# Patient Record
Sex: MALE | Race: WHITE | NOT HISPANIC OR LATINO | Employment: FULL TIME | URBAN - NONMETROPOLITAN AREA
[De-identification: names, ages, dates, MRNs, and addresses within clinical notes are randomized per-mention and may not be internally consistent; named-entity substitution may affect disease eponyms.]

---
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
## 2018-01-05 NOTE — PROGRESS NOTES
Patient Information     Patient Name MRN Sex Reji Monterroso [id number] Male 2000      Progress Notes by Richard Greene MD at 2017 11:15 AM     Author:  Richard Greene MD Service:  (none) Author Type:  Physician     Filed:  2017 11:47 AM Encounter Date:  2017 Status:  Signed     :  Richard Greene MD (Physician)            Has a wart on left knee he would llike removed- it bleeds when rubs on jeans and is uncomfortable/ this is an 8 mm wart/ treated with cryo

## 2018-01-05 NOTE — NURSING NOTE
Patient Information     Patient Name MRN Sex Reji Monterroso [id number] Male 2000      Nursing Note by Lynnette Sauer at 2017 11:15 AM     Author:  Lynnette Sauer Service:  (none) Author Type:  (none)     Filed:  2017 11:35 AM Encounter Date:  2017 Status:  Signed     :  Lynnette Sauer            Patient presents to the clinic to have a lesion checked on his left knee.  He noticed it while wrestling about 4 months ago.    Lynnette Sauer LPN....................2017 11:20 AM

## 2018-01-27 VITALS
SYSTOLIC BLOOD PRESSURE: 118 MMHG | DIASTOLIC BLOOD PRESSURE: 82 MMHG | BODY MASS INDEX: 24.14 KG/M2 | HEIGHT: 67 IN | WEIGHT: 153.8 LBS

## 2018-02-13 ENCOUNTER — DOCUMENTATION ONLY (OUTPATIENT)
Dept: FAMILY MEDICINE | Facility: OTHER | Age: 18
End: 2018-02-13

## 2018-02-13 PROBLEM — J45.909 ASTHMA: Status: ACTIVE | Noted: 2018-02-13

## 2020-06-18 ENCOUNTER — VIRTUAL VISIT (OUTPATIENT)
Dept: FAMILY MEDICINE | Facility: OTHER | Age: 20
End: 2020-06-18
Attending: PHYSICIAN ASSISTANT
Payer: COMMERCIAL

## 2020-06-18 VITALS — BODY MASS INDEX: 24.59 KG/M2 | WEIGHT: 157 LBS

## 2020-06-18 DIAGNOSIS — R05.9 COUGH: Primary | ICD-10-CM

## 2020-06-18 DIAGNOSIS — Z20.822 COVID-19 RULED OUT: Primary | ICD-10-CM

## 2020-06-18 PROCEDURE — 99202 OFFICE O/P NEW SF 15 MIN: CPT | Mod: 95 | Performed by: PHYSICIAN ASSISTANT

## 2020-06-18 PROCEDURE — C9803 HOPD COVID-19 SPEC COLLECT: HCPCS

## 2020-06-18 PROCEDURE — U0003 INFECTIOUS AGENT DETECTION BY NUCLEIC ACID (DNA OR RNA); SEVERE ACUTE RESPIRATORY SYNDROME CORONAVIRUS 2 (SARS-COV-2) (CORONAVIRUS DISEASE [COVID-19]), AMPLIFIED PROBE TECHNIQUE, MAKING USE OF HIGH THROUGHPUT TECHNOLOGIES AS DESCRIBED BY CMS-2020-01-R: HCPCS | Mod: ZL | Performed by: PHYSICIAN ASSISTANT

## 2020-06-18 SDOH — HEALTH STABILITY: MENTAL HEALTH: HOW OFTEN DO YOU HAVE A DRINK CONTAINING ALCOHOL?: NEVER

## 2020-06-18 ASSESSMENT — PAIN SCALES - GENERAL: PAINLEVEL: NO PAIN (0)

## 2020-06-18 NOTE — PROGRESS NOTES
"Reji Ricardo is a 19 year old male who is being evaluated via a billable telephone visit.      The patient has been notified of following:     \"This telephone visit will be conducted via a call between you and your physician/provider. We have found that certain health care needs can be provided without the need for a physical exam.  This service lets us provide the care you need with a short phone conversation.  If a prescription is necessary we can send it directly to your pharmacy.  If lab work is needed we can place an order for that and you can then stop by our lab to have the test done at a later time.    Telephone visits are billed at different rates depending on your insurance coverage. During this emergency period, for some insurers they may be billed the same as an in-person visit.  Please reach out to your insurance provider with any questions.    If during the course of the call the physician/provider feels a telephone visit is not appropriate, you will not be charged for this service.\"    Patient has given verbal consent for Telephone visit?  Yes    What phone number would you like to be contacted at? [phone]    How would you like to obtain your AVS? None    Subjective     Reji Ricardo is a 19 year old male who presents via phone visit today for the following health issues: 7 to 10 days of new cough that was worse yesterday.     HPI   Patient states his cough is slightly improved today however his employer excused him from work yesterday and requested that he be tested for COVID-19.  Patient did go home from work and napped for a couple hours but states that he is feeling fine today aside from a few nonproductive coughs this morning. He denies any sputum or hemoptysis, fevers, chills, night sweats, myalgias, new rashes, difficulty breathing, headaches, sore throat, loss of taste or smell, chest pain, or diarrhea.  Patient has not taken his temperature.     Patient Active Problem " List   Diagnosis     Allergic rhinitis     Asthma     Past Surgical History:   Procedure Laterality Date     APPENDECTOMY OPEN      06/22/2012,Laparoscopic       Social History     Tobacco Use     Smoking status: Never Smoker     Smokeless tobacco: Current User     Types: Chew     Tobacco comment: Quit smoking: very occasional   Substance Use Topics     Alcohol use: Never     Alcohol/week: 0.0 standard drinks     Frequency: Never     Family History   Problem Relation Age of Onset     Allergy (Severe) Mother         Allergies     Other - See Comments Mother      Other - See Comments Father         Allergic rhinitis     Hypertension Maternal Grandfather         Hypertension     Prostate Cancer Paternal Grandfather         Cancer-prostate     Diabetes Maternal Grandmother         Diabetes,DM           Reviewed and updated as needed this visit by Provider      Review of Systems   Constitutional, HEENT, cardiovascular, pulmonary, gi and gu systems are negative, except as otherwise noted.    Objective   Reported vitals:  Wt 71.2 kg (157 lb)   BMI 24.59 kg/m     CONSTITUTIONAL:  healthy, alert and no distress  PSYCH: Alert and oriented times 3; coherent speech, normal   rate and volume, able to articulate logical thoughts, able   to abstract reason, no tangential thoughts, no hallucinations   or delusions  His affect is normal  RESP: No cough, no audible wheezing, able to talk in full sentences  Remainder of exam unable to be completed due to telephone visits    Assessment/Plan:  1. Cough  Order COVID-19 test today.  Gave patient phone [phone] for the rapid clinic AFR and explained procedure for testing.  Follow-up as needed.    Phone call duration:  10 minutes    MISAEL Espinoza  Family Medicine  [facility] [place] [facility]      This document was prepared using voice generated software.  While every attempt was made for accuracy, grammatical errors may exist.

## 2020-06-18 NOTE — LETTER
June 20, 2020        Reji Ricardo  [address] [address] [address]    This letter provides a written record that you were tested for COVID-19 on 6/18/2020.       Your result was negative. This means that we didn t find the virus that causes COVID-19 in your sample. A test may show negative when you do actually have the virus. This can happen when the virus is in the early stages of infection, before you feel illness symptoms.    If you have symptoms   Stay home and away from others (self-isolate) until you meet ALL of the guidelines below:    You ve had no fever--and no medicine that reduces fever--for 3 full days (72 hours). And      Your other symptoms have gotten better. For example, your cough or breathing has improved. And     At least 10 days have passed since your symptoms started.    During this time:    Stay home. Don t go to work, school or anywhere else.     Stay in your own room, including for meals. Use your own bathroom if you can.    Stay away from others in your home. No hugging, kissing or shaking hands. No visitors.    Clean  high touch  surfaces often (doorknobs, counters, handles, etc.). Use a household cleaning spray or wipes. You can find a full list on the EPA website at www.epa.gov/pesticide-registration/list-n-disinfectants-use-against-sars-cov-2.    Cover your mouth and nose with a mask, tissue or washcloth to avoid spreading germs.    Wash your hands and face often with soap and water.    Going back to work  Check with your employer for any guidelines to follow for going back to work.    Employers: This document serves as formal notice that your employee tested negative for COVID-19, as of the testing date shown above.

## 2020-06-19 LAB
SARS-COV-2 RNA SPEC QL NAA+PROBE: NOT DETECTED
SPECIMEN SOURCE: NORMAL

## 2020-06-24 ENCOUNTER — TELEPHONE (OUTPATIENT)
Dept: FAMILY MEDICINE | Facility: OTHER | Age: 20
End: 2020-06-24

## 2020-06-24 NOTE — TELEPHONE ENCOUNTER
Patient notified that COVID was negative and he will receive a letter in the mail.  India Simon LPN ...... 6/24/2020 1:23 PM

## 2020-06-24 NOTE — TELEPHONE ENCOUNTER
SACHIN Martin-Pt has not gotten a neg or pos result for his covid test. Is it possible for you to call so he can go back to work. Thank you.  Aure Solis

## 2021-05-24 ENCOUNTER — OFFICE VISIT (OUTPATIENT)
Dept: FAMILY MEDICINE | Facility: OTHER | Age: 21
End: 2021-05-24
Attending: PHYSICIAN ASSISTANT
Payer: COMMERCIAL

## 2021-05-24 VITALS
RESPIRATION RATE: 16 BRPM | HEIGHT: 67 IN | DIASTOLIC BLOOD PRESSURE: 72 MMHG | OXYGEN SATURATION: 98 % | TEMPERATURE: 98.1 F | BODY MASS INDEX: 24.8 KG/M2 | HEART RATE: 66 BPM | SYSTOLIC BLOOD PRESSURE: 118 MMHG | WEIGHT: 158 LBS
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
## 2021-05-24 PROCEDURE — 99213 OFFICE O/P EST LOW 20 MIN: CPT | Performed by: PHYSICIAN ASSISTANT

## 2021-05-24 RX ORDER — DOXYCYCLINE HYCLATE 100 MG
100 TABLET ORAL 2 TIMES DAILY
Qty: 2 TABLET | Refills: 0 | Status: SHIPPED | OUTPATIENT
Start: 2021-05-24 | End: 2021-05-25

## 2021-05-24 ASSESSMENT — MIFFLIN-ST. JEOR: SCORE: 1675.31

## 2021-05-24 ASSESSMENT — PAIN SCALES - GENERAL: PAINLEVEL: NO PAIN (0)

## 2021-05-24 NOTE — PATIENT INSTRUCTIONS
Please refer to your AVS for follow up and pain/symptoms management recommendations (I.e.: medications, helpful conservative treatment modalities, appropriate follow up if need to a specialist or family practice, etc.). Please return to either your primary care provider rapid/urgent care clinic if your symptoms change or worsen.     Discharge instructions:  -If you were prescribed a medication(s), please take this as prescribed/directed  -Monitor your symptoms, if changing/worsening, return to UC/ER or PCP for follow up    Tick Bite   -For some, labs are also drawn to check for Lyme disease - if you had labs drawn, please note that these labs can take 3-7 days to return, either your PCP or a member of the UC team should reach out to you with your results, if you do not receive these, please reach out to your PCP.   -Please take the course of antibiotics until completion (if prescribed).    -For prevention of further bites, we recommend long shirts/pants while outdoors, tick repellant with > 20% DEET, take a shower or bathe within 2 hours of being outdoors, check yourself for ticks (look in unusual locations such as hair, behind ears/knees, etc.).   -For pain control (if needed), if you are able to take Ibuprofen and Tylenol, we recommend alternating these (see note below). Do not wear a patch over your eye (unless directed to do so).    -Alternate every 4 hours as needed. I.e.: Ibuprofen at 8am, Tylenol 12pm, Ibuprofen 4pm    -Daily maximum of Tylenol is 4000mg (recommend staying under 3000mg)   -Daily maximum of Ibuprofen is 1200mg (take no more than six 200mg pills a day)    Additional Information:  Any remaining embedded matter can be left and will clear naturally.   Monitor the area where ticks were removed as they can become infected also. May use antibacterial ointment.    -Monitor for the below symptoms for 30 days and call your healthcare provider if you get any of the following:   ? Fatigue    ? Headache     ? Neck stiffness    ? Myalgias/muscle soreness   ? Arthralgias/joint pain   ? Regional lymphadenopathy/swollen lymph nodes   ? Fever    * Keep vigilant with tick checks.    Risk of Lyme disease is very low with the tick has been attached for fewer than 36 hours.    Common locations to check for ticks in the future include entering around her ears, and and around the hair, inside the bellybutton, under your arms, around her waist, between your legs and the back sides of your knees.    Approach to prophylaxis -- per up to date guidelines, they agree with the Infectious Diseases Society of [place] (IDSA) guidelines that recommend antibiotic prophylaxis only in patients who meet all of the following criteria:  ?Attached tick identified as an adult or nymphal I. scapularis tick (deer tick).  ?Tick is estimated to have been attached for ?36 hours (by degree of engorgement or time of exposure).  ?Prophylaxis is begun within 72 hours of tick removal.  ?Local rate of infection of ticks with B. burgdorferi is ?20 percent (these rates of infection have been shown to occur in parts of [place], parts of the [place], and parts of [place] and [place]).

## 2021-05-24 NOTE — PROGRESS NOTES
ASSESSMENT/PLAN:    I have reviewed the nursing notes.  I have reviewed the findings, diagnosis, plan and need for follow up with the patient.    (W57.XXXA) Tick bite, initial encounter  (primary encounter diagnosis)  Comment: criteria met  Plan: doxycycline hyclate (VIBRA-TABS) 100 MG tablet    Vitals signs stable. Criteria met for 1 time 200 mg PO Doxycycline dosage - deer tick, > 36 hours attachment and presenting within 72 hours of removal. Warning signs: flu like symptoms, cardiac/neurologic changes, arthralgias, lymphadenopathy to monitor for these symptoms over the next month, if these occur recommend follow up for Lyme testing. Last tetanus 2010, patient declined booster today, states will follow up with PCP instead, understands risks. May use over-the-counter Tylenol or ibuprofen PRN. OTC antihistamines for pruritic symptoms. Monitor bite site. Patient is in agreement and understanding of the above treatment plan. All questions and concerns were addressed and answered to patient's satisfaction. AVS reviewed with patient.     Discussed if lyme testing done today likely false negative as takes 2-4 weeks plus for body to form antibodies.     Discussed warning signs/symptoms indicative of need to f/u    Follow up if symptoms persist or worsen or concerns    I explained my diagnostic considerations and recommendations to the patient, who voiced understanding and agreement with the treatment plan. All questions were answered. We discussed potential side effects of any prescribed or recommended therapies, as well as expectations for response to treatments.    Rena Eastman PA-C  5/24/2021  10:22 AM    HPI:    Reji Ricardo is a 20 year old male  who presents to [facility] today for concerns of tick bite to back of head.    Symptoms: Rash < 5 cm, developed 24-48 hours after bite  Location: head    Type of Tick: deer tick  Length of attachment: unsure, possibly Friday  Rash: none  Fever, Chills:  none  Muscle Aches: none  Stiffness of Neck: none  Lymphadenopathy Present: No   Facial Nerve Palsy Present: none  Treatments Tried: removal of tick  Prior Tick Bites: yes    Last Tetanus Shot: 2010 - declined today - will call PCP    C: NEGATIVE for fever, chills, change in weight  I: NEGATIVE for worrisome rashes, moles or lesions  HEME/ALLERGY/IMMUNE:  No symptoms of anaphylaxis or significant allergic reaction    Approach to prophylaxis per UTD -- We agree with the Infectious Diseases Society [place] (IDSA) guidelines that recommend antibiotic prophylaxis only in patients who meet all of the following criteria (table 2) [10]:  ?Attached tick identified as an adult or nymphal I. scapularis tick (deer tick).  ?Tick is estimated to have been attached for ?36 hours (by degree of engorgement or time of exposure).  ?Prophylaxis is begun within 72 hours of tick removal.  ?Local rate of infection of ticks with B. burgdorferi is ?20 percent (these rates of infection have been shown to occur in parts of [place], parts of the [place], and parts of [place] and [place]).    History reviewed. No pertinent past medical history.  Past Surgical History:   Procedure Laterality Date     APPENDECTOMY OPEN      06/22/2012,Laparoscopic     Social History     Tobacco Use     Smoking status: Never Smoker     Smokeless tobacco: Current User     Types: Chew     Tobacco comment: Quit smoking: very occasional   Substance Use Topics     Alcohol use: Never     Alcohol/week: 0.0 standard drinks     Frequency: Never     Current Outpatient Medications   Medication Sig Dispense Refill     doxycycline hyclate (VIBRA-TABS) 100 MG tablet Take 1 tablet (100 mg) by mouth 2 times daily for 1 day 2 tablet 0     Not on File  Past medical history, past surgical history, current medications and allergies reviewed and accurate to the best of my knowledge.      ROS:  Refer to HPI    /72 (BP Location: Right arm, Patient Position:  "Sitting, Cuff Size: Adult Regular)   Pulse 66   Temp 98.1  F (36.7  C) (Tympanic)   Resp 16   Ht 1.702 m (5' 7\")   Wt 71.7 kg (158 lb)   SpO2 98%   BMI 24.75 kg/m      EXAM:  General Appearance: Well appearing 20-year-old male, appropriate appearance for age. No acute distress  Respiratory: normal chest wall and respirations.  Normal effort.  Clear to auscultation bilaterally, no wheezing, crackles or rhonchi.  No increased work of breathing.  No cough appreciated.  Cardiac: RRR with no murmurs  Dermatological: 0.2 cm erythematous bite to posterior right sided occiput. No erythema migrans noted.   Psychological: normal affect, alert, oriented, and pleasant.     Labs:  None    Xray:  None    "

## 2021-05-24 NOTE — NURSING NOTE
Patient presents to clinic after finding tick embedded to back of head this morning.  Medication Reconciliation: complete    Meron Saha LPN

## 2021-05-25 ASSESSMENT — ASTHMA QUESTIONNAIRES: ACT_TOTALSCORE: 25

## 2021-06-07 ENCOUNTER — HOSPITAL ENCOUNTER (EMERGENCY)
Facility: OTHER | Age: 21
Discharge: HOME | End: 2021-06-07
Attending: STUDENT IN AN ORGANIZED HEALTH CARE EDUCATION/TRAINING PROGRAM | Admitting: STUDENT IN AN ORGANIZED HEALTH CARE EDUCATION/TRAINING PROGRAM
Payer: COMMERCIAL

## 2021-06-07 VITALS
TEMPERATURE: 98.4 F | BODY MASS INDEX: 24.48 KG/M2 | SYSTOLIC BLOOD PRESSURE: 126 MMHG | DIASTOLIC BLOOD PRESSURE: 65 MMHG | OXYGEN SATURATION: 98 % | WEIGHT: 156 LBS | HEART RATE: 72 BPM | HEIGHT: 67 IN | RESPIRATION RATE: 18 BRPM

## 2021-06-07 DIAGNOSIS — S05.01XA ABRASION OF RIGHT CORNEA, INITIAL ENCOUNTER: ICD-10-CM

## 2021-06-07 PROCEDURE — 99283 EMERGENCY DEPT VISIT LOW MDM: CPT | Performed by: STUDENT IN AN ORGANIZED HEALTH CARE EDUCATION/TRAINING PROGRAM

## 2021-06-07 RX ORDER — TETRACAINE HYDROCHLORIDE 5 MG/ML
1-2 SOLUTION OPHTHALMIC ONCE
Status: DISCONTINUED | OUTPATIENT
Start: 2021-06-07 | End: 2021-06-07 | Stop reason: HOSPADM

## 2021-06-07 RX ORDER — POLYMYXIN B SULFATE AND TRIMETHOPRIM 1; 10000 MG/ML; [USP'U]/ML
1-2 SOLUTION OPHTHALMIC
Qty: 5 ML | Refills: 0 | Status: SHIPPED | OUTPATIENT
Start: 2021-06-07 | End: 2021-06-17

## 2021-06-07 ASSESSMENT — MIFFLIN-ST. JEOR: SCORE: 1666.24

## 2021-06-07 NOTE — ED PROVIDER NOTES
History     Chief Complaint   Patient presents with     Eye Injury     HPI  Reji Ricardo is a 20 year old male otherwise healthy presents for evaluation of foreign body sensation in his right eye.  He reports that he was cutting rebar 3 days ago when he noticed sudden pain in his right lower eye, this lasted a short period and then slowly improved.  He does however continue to have a sensation that something is stuck in his right lower eyelid.  He denies significant tearing or redness of his right eye, his vision is unchanged and normal.  He did not previously irrigate his eye.  He denies any other complaints.    Allergies:  Not on File    Problem List:    Patient Active Problem List    Diagnosis Date Noted     Asthma 02/13/2018     Priority: Medium     Allergic rhinitis 12/01/2010     Priority: Medium        Past Medical History:    No significant past medical history    Past Surgical History:    Past Surgical History:   Procedure Laterality Date     APPENDECTOMY OPEN      06/22/2012,Laparoscopic       Family History:    Family History   Problem Relation Age of Onset     Allergy (Severe) Mother         Allergies     Other - See Comments Mother      Other - See Comments Father         Allergic rhinitis     Hypertension Maternal Grandfather         Hypertension     Prostate Cancer Paternal Grandfather         Cancer-prostate     Diabetes Maternal Grandmother         Diabetes,DM       Social History:  Marital Status:  Single [1]  Social History     Tobacco Use     Smoking status: Never Smoker     Smokeless tobacco: Current User     Types: Chew     Tobacco comment: Quit smoking: very occasional   Substance Use Topics     Alcohol use: Never     Alcohol/week: 0.0 standard drinks     Frequency: Never     Drug use: Unknown     Types: Other     Comment: Drug use: Not Asked        Medications:    trimethoprim-polymyxin b (POLYTRIM) 72767-9.1 UNIT/ML-% ophthalmic solution          Review of Systems  Please see HPI  "above for pertinent positives and negatives.  All other systems reviewed and found to be negative.    Physical Exam   BP: 126/65  Pulse: 97  Temp: 98.4  F (36.9  C)  Resp: 18  Height: 170.2 cm (5' 7\")  Weight: 70.8 kg (156 lb)  SpO2: 98 %      Physical Exam  Gen: Sitting on chair, no acute distress, alert  HEENT: NC/AT, MMM, conjunctivae and sclerae clear. No FB right eye with eversion of the lower eyelid. On fluorescein staining small area of uptake approximately 0.5 cm inferior to the limbus at 6 o'clock position. No eyelid swelling, erythema, or tenderness.  CV: RRR, appears warm and well-perfused  Pulm: CTAB, normal respiratory effort  Skin: No rash or other lesions  MSK: No gross deformities or swelling  Neuro: A&O x4, no focal deficits, CN II-XII grossly intact, vision intact and symmetric, PERRL, EOMI    ED Course     ED Course as of Jun 07 1641   Mon Jun 07, 2021   1530 Patient evaluated, tetracaine placed in right eye; no FB identified with eversion of the lower eyelid. On fluorescein staining small uptake inferior to limbus consistent with abrasion. Plan to treat with polytrim eye drops, eye clinic follow-up        Procedures               Critical Care time:  none               No results found for this or any previous visit (from the past 24 hour(s)).    Medications   tetracaine (PONTOCAINE) 0.5 % ophthalmic solution 1-2 drop (has no administration in time range)   fluorescein (FUL-COLIN) ophthalmic strip 1 strip (has no administration in time range)       Assessments & Plan (with Medical Decision Making)   20-year-old male otherwise healthy presents with foreign body sensation in right lower eye, found to have no foreign body but with evidence of corneal abrasion approximately 0.5 cm inferior to the limbus at the 6 o'clock position.  Patient vitally stable and afebrile.  No other concerning findings on exam to suggest endophthalmitis, uveitis, preseptal cellulitis, or other infectious process.  No " evidence of metallic foreign body whatsoever.  He does not wear contacts.  Patient is appropriate for discharge with prescription for Polytrim eyedrops, eye clinic follow-up later this week, careful return precautions reviewed.    From ED discharge instructions:  You have an injury to the surface of your eye. This will heal with time.    You will need to use eye drops (polytrim, an antibiotic) as prescribed to treat this and help prevent infection.    You can take tylenol and ibuprofen as needed for pain or discomfort.    Follow up in eye clinic in 2 to 3 days for a re-check.    Come back to the emergency department if new change in vision, fever, swelling of the eyelids or eyeball, or any other acute concerns.    I have reviewed the nursing notes.    I have reviewed the findings, diagnosis, plan and need for follow up with the patient.       Discharge Medication List as of 6/7/2021  3:48 PM      START taking these medications    Details   trimethoprim-polymyxin b (POLYTRIM) 60233-7.1 UNIT/ML-% ophthalmic solution Place 1-2 drops into the right eye every 4 hours (while awake) for 10 days, Disp-5 mL, R-0, E-Prescribe             Final diagnoses:   Abrasion of right cornea, initial encounter       6/7/2021   [facility] Aman Obando MD  06/07/21 [id number]

## 2021-06-07 NOTE — ED TRIAGE NOTES
Pt was cutting rebar on June 4th, felt a piece of metal go into his right eye, felt it was in his lower lid.

## 2021-06-07 NOTE — DISCHARGE INSTRUCTIONS
You have an injury to the surface of your eye. This will heal with time.    You will need to use eye drops (polytrim, an antibiotic) as prescribed to treat this and help prevent infection.    You can take tylenol and ibuprofen as needed for pain or discomfort.    Follow up in eye clinic in 2 to 3 days for a re-check.    Come back to the emergency department if new change in vision, fever, swelling of the eyelids or eyeball, or any other acute concerns.

## 2023-02-21 ENCOUNTER — OFFICE VISIT (OUTPATIENT)
Dept: FAMILY MEDICINE | Facility: OTHER | Age: 23
End: 2023-02-21
Attending: NURSE PRACTITIONER
Payer: COMMERCIAL

## 2023-02-21 VITALS
TEMPERATURE: 97.1 F | SYSTOLIC BLOOD PRESSURE: 122 MMHG | DIASTOLIC BLOOD PRESSURE: 74 MMHG | HEART RATE: 78 BPM | OXYGEN SATURATION: 98 % | WEIGHT: 180.4 LBS | RESPIRATION RATE: 16 BRPM | BODY MASS INDEX: 28.25 KG/M2

## 2023-02-21 DIAGNOSIS — J06.9 VIRAL URI WITH COUGH: ICD-10-CM

## 2023-02-21 DIAGNOSIS — Z02.89 ENCOUNTER TO OBTAIN EXCUSE FROM SCHOOL: Primary | ICD-10-CM

## 2023-02-21 PROCEDURE — 99213 OFFICE O/P EST LOW 20 MIN: CPT | Performed by: NURSE PRACTITIONER

## 2023-02-21 ASSESSMENT — PAIN SCALES - GENERAL: PAINLEVEL: MILD PAIN (2)

## 2023-02-21 NOTE — LETTER
[facility]  [address]  [place] [address]  Phone: [phone]  Fax: [phone]    February 21, 2023        Reji Ricardo  [address] [facility] [address]          To whom it may concern:    RE: Reji Ricardo    Patient was seen and treated today at our clinic and missed school 2/20/23 to 2/22/23 due to upper respiratory illness.      Please contact me for questions or concerns.      Sincerely,        Silva Umana NP

## 2023-02-21 NOTE — PROGRESS NOTES
ASSESSMENT/PLAN:     I have reviewed the nursing notes.  I have reviewed the findings, diagnosis, plan and need for follow up with the patient.      1. Encounter to obtain excuse from school    School note completed for days missed due to illness    2. Viral URI with cough    Discussed with patient that symptoms and exam are consistent with viral illness.    No clinical indications for antibiotic treatment at this time.      Symptomatic treatment - Encouraged fluids, salt water gargles, honey, elevation, humidifier, saline nasal spray, sinus rinse/netti pot, lozenges, tea, soup, smoothies, popsicles, topical vapor rub, rest, etc     May use over the counter cough or cold medication PRN  May use over-the-counter Tylenol or ibuprofen PRN    Discussed warning signs/symptoms indicative of need to f/u  Follow up if symptoms persist or worsen or concerns      I explained my diagnostic considerations and recommendations to the patient, who voiced understanding and agreement with the treatment plan. All questions were answered. We discussed potential side effects of any prescribed or recommended therapies, as well as expectations for response to treatments.    Silva Umana NP  [facility]      SUBJECTIVE:   Reji Ricardo is a 22 year old male who presents to clinic today for the following health issues:  School note and URI    HPI  States URI symptoms for the past 4 days including sore throat, runny/stuffy nose, sneezing, cough, body aches and fatigue.  Denies fevers, chills or sweats.  No headaches.  No shortness of breath.  Ears plugged with discomfort initially, resolved.  Normal appetite.  No vomiting or diarrhea.  States he is feeling better.  Needs school note.  No OTC medications      No past medical history on file.  Past Surgical History:   Procedure Laterality Date     APPENDECTOMY OPEN      06/22/2012,Laparoscopic     Social History     Tobacco Use     Smoking status: Never      Smokeless tobacco: Current     Types: Chew     Tobacco comments:     Quit smoking: very occasional   Substance Use Topics     Alcohol use: Never     Alcohol/week: 0.0 standard drinks     No current outpatient medications on file.     No Known Allergies      Past medical history, past surgical history, current medications and allergies reviewed and accurate to the best of my knowledge.        OBJECTIVE:     /74   Pulse 78   Temp 97.1  F (36.2  C) (Tympanic)   Resp 16   Wt 81.8 kg (180 lb 6.4 oz)   SpO2 98%   BMI 28.25 kg/m    Body mass index is 28.25 kg/m .     Physical Exam  General Appearance: Well appearing adult male, appropriate appearance for age. No acute distress  Ears: Left TM intact, no erythema, no effusion, no bulging, no purulence.  Right TM intact, no erythema, no effusion, no bulging, no purulence.  Left auditory canal clear without drainage or bleeding.  Right auditory canal clear without drainage or bleeding.  Normal external ears, non tender.  Eyes: conjunctivae normal without erythema or irritation, corneas clear, no drainage or crusting, no eyelid swelling, pupils equal   Orophayrnx: moist mucous membranes, pharynx without erythema, tonsils without hypertrophy, tonsils without erythema, no tonsillar exudates, no oral lesions, no palate petechiae, no post nasal drip seen, no trismus, voice clear.    Nose: drainage and congestion   Neck: supple without adenopathy  Respiratory: normal chest wall and respirations.  Normal effort.  Clear to auscultation bilaterally, no wheezing, crackles or rhonchi.  No increased work of breathing.  No cough appreciated.  Cardiac: RRR with no murmurs  Musculoskeletal:  Equal movement of bilateral upper extremities.  Equal movement of bilateral lower extremities.  Normal gait.    Psychological: normal affect, alert, oriented, and pleasant.

## 2023-02-21 NOTE — NURSING NOTE
Patient presents to clinic for possible upper respiratory infection  Patient states he also needs a note for school  /74   Pulse 78   Temp 97.1  F (36.2  C) (Tympanic)   Resp 16   Wt 81.8 kg (180 lb 6.4 oz)   SpO2 98%   BMI 28.25 kg/m    Rosemary Pacheco LPN on 2/21/2023 at 1:54 PM

## 2023-04-03 ENCOUNTER — OFFICE VISIT (OUTPATIENT)
Dept: FAMILY MEDICINE | Facility: OTHER | Age: 23
End: 2023-04-03
Payer: COMMERCIAL

## 2023-04-03 VITALS
HEIGHT: 67 IN | TEMPERATURE: 98.1 F | BODY MASS INDEX: 28.69 KG/M2 | DIASTOLIC BLOOD PRESSURE: 70 MMHG | HEART RATE: 98 BPM | SYSTOLIC BLOOD PRESSURE: 128 MMHG | OXYGEN SATURATION: 98 % | WEIGHT: 182.8 LBS | RESPIRATION RATE: 18 BRPM

## 2023-04-03 DIAGNOSIS — J02.9 SORE THROAT: ICD-10-CM

## 2023-04-03 DIAGNOSIS — U07.1 INFECTION DUE TO 2019 NOVEL CORONAVIRUS: Primary | ICD-10-CM

## 2023-04-03 DIAGNOSIS — R05.1 ACUTE COUGH: ICD-10-CM

## 2023-04-03 LAB
GROUP A STREP BY PCR: NOT DETECTED
SARS-COV-2 RNA RESP QL NAA+PROBE: POSITIVE

## 2023-04-03 PROCEDURE — U0003 INFECTIOUS AGENT DETECTION BY NUCLEIC ACID (DNA OR RNA); SEVERE ACUTE RESPIRATORY SYNDROME CORONAVIRUS 2 (SARS-COV-2) (CORONAVIRUS DISEASE [COVID-19]), AMPLIFIED PROBE TECHNIQUE, MAKING USE OF HIGH THROUGHPUT TECHNOLOGIES AS DESCRIBED BY CMS-2020-01-R: HCPCS | Mod: ZL

## 2023-04-03 PROCEDURE — 87651 STREP A DNA AMP PROBE: CPT | Mod: ZL

## 2023-04-03 PROCEDURE — 99213 OFFICE O/P EST LOW 20 MIN: CPT | Mod: CS

## 2023-04-03 PROCEDURE — C9803 HOPD COVID-19 SPEC COLLECT: HCPCS

## 2023-04-03 ASSESSMENT — PAIN SCALES - GENERAL: PAINLEVEL: MILD PAIN (2)

## 2023-04-03 NOTE — PROGRESS NOTES
ASSESSMENT/PLAN:    I have reviewed the nursing notes.  I have reviewed the findings, diagnosis, plan and need for follow up with the patient.    1. Infection due to 2019 novel coronavirus  2. Sore throat  3. Acute cough  - Group A Streptococcus PCR Throat Swab  - Symptomatic COVID-19 Virus (Coronavirus) by PCR Nose    Patient presents with upper respiratory symptoms.  Patient's vitals are stable and he appears nontoxic.  Patient tested positive for COVID today in clinic he was notified of the results.  Patient was offered antiviral but he declined at this time. Discussed symptomatic treatment - Encouraged fluids, salt water gargles, honey, elevation, humidifier, sinus rinse/netti pot, lozenges, tea, topical vapor rub, popsicles, rest, etc. May use over-the-counter Tylenol or ibuprofen PRN.  Advised patient to follow CDC guidelines for quarantining.    Discussed warning signs/symptoms indicative of need to f/u    Follow up if symptoms persist or worsen or concerns    I explained my diagnostic considerations and recommendations to the patient, who voiced understanding and agreement with the treatment plan. All questions were answered. We discussed potential side effects of any prescribed or recommended therapies, as well as expectations for response to treatments.    Shaheen Blunt, DICKSON CNP  4/3/2023  1:13 PM    HPI:    Reji Ricardo is a 22 year old male  who presents to [facility] today for concerns of URI symptoms    URI, x 1 day    Symptoms:  No fevers or chills.   YES: + sore throat/pharyngitis/tonsillitis.   YES: + allergy/URI Symptoms  No muffled sounds/change in hearing  No sensation of fullness in ear(s)  No ringing in ears/tinnitus  No balance changes  No dizziness  YES: + congestion (head/nasal/chest)  YES: + cough/productive cough  YES: + post nasal drip   No headache  YES: + sinus pain/pressure  YES: + myalgias  No otalgia  No rash  Activity Level Changes: Yes: increased  "fatigue  Appetite/Liquid Intake Changes: No  Changes to Bowel Habits: No  Changes to Bladder Habits: No  Additional Symptoms to Report: Yes: shortness of breath, chest tightness  History of similar symptoms: No  Prior workup: No    Treatments tried: Tylenol/Ibuprofen, Fluids and Rest    Site of exposure: home to family  Type of exposure: colds    Other Pertinent History: none    Allergies: NKA    PCP: none    History reviewed. No pertinent past medical history.  Past Surgical History:   Procedure Laterality Date     APPENDECTOMY OPEN      06/22/2012,Laparoscopic     Social History     Tobacco Use     Smoking status: Never     Smokeless tobacco: Current     Types: Chew     Tobacco comments:     Quit smoking: very occasional   Vaping Use     Vaping status: Not on file   Substance Use Topics     Alcohol use: Never     Alcohol/week: 0.0 standard drinks of alcohol     No current outpatient medications on file.     No Known Allergies  Past medical history, past surgical history, current medications and allergies reviewed and accurate to the best of my knowledge.      ROS:  Refer to HPI    /70 (BP Location: Right arm, Patient Position: Chair, Cuff Size: Adult Regular)   Pulse 98   Temp 98.1  F (36.7  C) (Tympanic)   Resp 18   Ht 1.702 m (5' 7\")   Wt 82.9 kg (182 lb 12.8 oz)   SpO2 98%   BMI 28.63 kg/m      EXAM:  General Appearance: Well appearing 22 year old male, appropriate appearance for age. No acute distress   Ears: Left TM intact, translucent with bony landmarks appreciated, no erythema, no effusion, no bulging, no purulence.  Right TM intact, translucent with bony landmarks appreciated, no erythema, no effusion, no bulging, no purulence.  Left auditory canal clear.  Right auditory canal clear.  Normal external ears, non tender.  Eyes: conjunctivae normal without erythema or irritation, corneas clear, no drainage or crusting, no eyelid swelling, pupils equal   Oropharynx: moist mucous membranes, " posterior pharynx with mild  erythema, tonsils symmetric and 2+, mild erythema, no exudates or petechiae, no post nasal drip seen, no trismus, voice clear.    Sinuses:  No sinus tenderness upon palpation of the frontal or maxillary sinuses  Nose:  Bilateral nares: no erythema, no edema, clear drainage and congestion   Neck: supple without adenopathy  Respiratory: normal chest wall and respirations.  Normal effort.  Clear to auscultation bilaterally, no wheezing, crackles or rhonchi.  No increased work of breathing.  No cough appreciated.  Cardiac: RRR with no murmurs  Neuro: Alert and oriented to person, place, and time.    Psychological: normal affect, alert, oriented, and pleasant.     Labs:  Results for orders placed or performed in visit on 04/03/23   Symptomatic COVID-19 Virus (Coronavirus) by PCR Nose     Status: Abnormal    Specimen: Nose; Swab   Result Value Ref Range    SARS CoV2 PCR Positive (A) Negative    Narrative    Testing was performed using the Xpert Xpress SARS-CoV-2 Assay on the Cepheid Gene-Xpert Instrument Systems. Additional information about this Emergency Use Authorization (EUA) assay can be found via the Lab Guide. This test should be ordered for the detection of SARS-CoV-2 in individuals who meet SARS-CoV-2 clinical and/or epidemiological criteria as well as from individuals without symptoms or other reasons to suspect COVID-19. Test performance for asymptomatic patients has only been established in anterior nasal swab specimens. This test is for in vitro diagnostic use under the FDA EUA for laboratories certified under CLIA to perform high complexity testing. This test has not been FDA cleared or approved. A negative result does not rule out the presence of PCR inhibitors in the specimen or target RNA concentration below the limit of detection for the assay. The possibility of a false negative should be considered if the patient's recent exposure or clinical presentation suggests COVID-19.  This test was validated by [facility]. This laboratory is certified under the Clinical Laboratory Improvement Amendments (CLIA) as qualified to perform high complexity clinical laboratory testing.   Group A Streptococcus PCR Throat Swab     Status: Normal    Specimen: Throat; Swab   Result Value Ref Range    Group A strep by PCR Not Detected Not Detected    Narrative    The Xpert Xpress Strep A test, performed on the Potential  Instrument Systems, is a rapid, qualitative in vitro diagnostic test for the detection of Streptococcus pyogenes (Group A ß-hemolytic Streptococcus, Strep A) in throat swab specimens from patients with signs and symptoms of pharyngitis. The Xpert Xpress Strep A test can be used as an aid in the diagnosis of Group A Streptococcal pharyngitis. The assay is not intended to monitor treatment for Group A Streptococcus infections. The Xpert Xpress Strep A test utilizes an automated real-time polymerase chain reaction (PCR) to detect Streptococcus pyogenes DNA.

## 2023-04-03 NOTE — LETTER
April 3, 2023      Reji Ricardo  [id number] [address] [id number]        To Whom It May Concern:    Reji Ricardo was seen in our clinic on 4/3/23 and may return to class once fever free for 24 hours.      Sincerely,        DICKSON Sam CNP

## 2023-04-03 NOTE — NURSING NOTE
"Chief Complaint   Patient presents with     URI     X 2 days-congested, body aches, throat pain        Initial /70 (BP Location: Right arm, Patient Position: Chair, Cuff Size: Adult Regular)   Pulse 98   Temp 98.1  F (36.7  C) (Tympanic)   Resp 18   Ht 1.702 m (5' 7\")   Wt 82.9 kg (182 lb 12.8 oz)   SpO2 98%   BMI 28.63 kg/m   Estimated body mass index is 28.63 kg/m  as calculated from the following:    Height as of this encounter: 1.702 m (5' 7\").    Weight as of this encounter: 82.9 kg (182 lb 12.8 oz).  Medication Reconciliation: complete      FOOD SECURITY SCREENING QUESTIONS:    The next two questions are to help us understand your food security.  If you are feeling you need any assistance in this area, we have resources available to support you today.    Hunger Vital Signs:  Within the past 12 months we worried whether our food would run out before we got money to buy more. Never  Within the past 12 months the food we bought just didn't last and we didn't have money to get more. Never  Emerita Jones LPN on 4/3/2023 at 1:07 PM   "

## 2023-04-21 ENCOUNTER — OFFICE VISIT (OUTPATIENT)
Dept: FAMILY MEDICINE | Facility: OTHER | Age: 23
End: 2023-04-21
Attending: STUDENT IN AN ORGANIZED HEALTH CARE EDUCATION/TRAINING PROGRAM
Payer: COMMERCIAL

## 2023-04-21 VITALS
SYSTOLIC BLOOD PRESSURE: 128 MMHG | WEIGHT: 184.13 LBS | DIASTOLIC BLOOD PRESSURE: 68 MMHG | RESPIRATION RATE: 18 BRPM | BODY MASS INDEX: 28.9 KG/M2 | HEIGHT: 67 IN | OXYGEN SATURATION: 98 % | HEART RATE: 70 BPM | TEMPERATURE: 97.8 F

## 2023-04-21 DIAGNOSIS — G47.26 SHIFT WORK SLEEP DISORDER: Primary | ICD-10-CM

## 2023-04-21 PROCEDURE — 99213 OFFICE O/P EST LOW 20 MIN: CPT | Performed by: STUDENT IN AN ORGANIZED HEALTH CARE EDUCATION/TRAINING PROGRAM

## 2023-04-21 RX ORDER — FINASTERIDE 1 MG/1
1 TABLET, FILM COATED ORAL DAILY
COMMUNITY
Start: 2023-04-21

## 2023-04-21 RX ORDER — MULTIVITAMIN
1 TABLET ORAL DAILY
COMMUNITY
Start: 2023-04-21

## 2023-04-21 RX ORDER — ZOLPIDEM TARTRATE 5 MG/1
2.5-5 TABLET ORAL
Qty: 30 TABLET | Refills: 5 | Status: SHIPPED | OUTPATIENT
Start: 2023-04-21 | End: 2023-11-06

## 2023-04-21 ASSESSMENT — PAIN SCALES - GENERAL: PAINLEVEL: NO PAIN (0)

## 2023-04-21 ASSESSMENT — ANXIETY QUESTIONNAIRES
IF YOU CHECKED OFF ANY PROBLEMS ON THIS QUESTIONNAIRE, HOW DIFFICULT HAVE THESE PROBLEMS MADE IT FOR YOU TO DO YOUR WORK, TAKE CARE OF THINGS AT HOME, OR GET ALONG WITH OTHER PEOPLE: SOMEWHAT DIFFICULT
2. NOT BEING ABLE TO STOP OR CONTROL WORRYING: SEVERAL DAYS
1. FEELING NERVOUS, ANXIOUS, OR ON EDGE: SEVERAL DAYS
8. IF YOU CHECKED OFF ANY PROBLEMS, HOW DIFFICULT HAVE THESE MADE IT FOR YOU TO DO YOUR WORK, TAKE CARE OF THINGS AT HOME, OR GET ALONG WITH OTHER PEOPLE?: SOMEWHAT DIFFICULT
7. FEELING AFRAID AS IF SOMETHING AWFUL MIGHT HAPPEN: NOT AT ALL
GAD7 TOTAL SCORE: 5
5. BEING SO RESTLESS THAT IT IS HARD TO SIT STILL: NOT AT ALL
GAD7 TOTAL SCORE: 5
7. FEELING AFRAID AS IF SOMETHING AWFUL MIGHT HAPPEN: NOT AT ALL
3. WORRYING TOO MUCH ABOUT DIFFERENT THINGS: SEVERAL DAYS
4. TROUBLE RELAXING: SEVERAL DAYS
6. BECOMING EASILY ANNOYED OR IRRITABLE: SEVERAL DAYS
GAD7 TOTAL SCORE: 5

## 2023-04-21 ASSESSMENT — ASTHMA QUESTIONNAIRES
QUESTION_5 LAST FOUR WEEKS HOW WOULD YOU RATE YOUR ASTHMA CONTROL: COMPLETELY CONTROLLED
ACT_TOTALSCORE: 25
QUESTION_3 LAST FOUR WEEKS HOW OFTEN DID YOUR ASTHMA SYMPTOMS (WHEEZING, COUGHING, SHORTNESS OF BREATH, CHEST TIGHTNESS OR PAIN) WAKE YOU UP AT NIGHT OR EARLIER THAN USUAL IN THE MORNING: NOT AT ALL
QUESTION_4 LAST FOUR WEEKS HOW OFTEN HAVE YOU USED YOUR RESCUE INHALER OR NEBULIZER MEDICATION (SUCH AS ALBUTEROL): NOT AT ALL
ACT_TOTALSCORE: 25
QUESTION_1 LAST FOUR WEEKS HOW MUCH OF THE TIME DID YOUR ASTHMA KEEP YOU FROM GETTING AS MUCH DONE AT WORK, SCHOOL OR AT HOME: NONE OF THE TIME
QUESTION_2 LAST FOUR WEEKS HOW OFTEN HAVE YOU HAD SHORTNESS OF BREATH: NOT AT ALL

## 2023-04-21 ASSESSMENT — PATIENT HEALTH QUESTIONNAIRE - PHQ9
SUM OF ALL RESPONSES TO PHQ QUESTIONS 1-9: 5
SUM OF ALL RESPONSES TO PHQ QUESTIONS 1-9: 5
10. IF YOU CHECKED OFF ANY PROBLEMS, HOW DIFFICULT HAVE THESE PROBLEMS MADE IT FOR YOU TO DO YOUR WORK, TAKE CARE OF THINGS AT HOME, OR GET ALONG WITH OTHER PEOPLE: SOMEWHAT DIFFICULT

## 2023-04-21 NOTE — NURSING NOTE
"Patient presents to clinic experiencing trouble falling, staying asleep, extreme fatigue.  He states, \"I need 10 hours of sleep a night to feel good.\"    Medication Rec Complete  Meron Saha LPN............4/21/2023 3:54 PM    "

## 2023-04-21 NOTE — PROGRESS NOTES
"  Assessment & Plan     Shift work sleep disorder  Symptoms consistent with shift work sleep disorder. Has good sleep hygiene, oc sleep aids not helpful. No anxiety or depression symptoms. Reviewed benefits vs risks and side effects of medication and patient in agreement to start taking.   - zolpidem (AMBIEN) 5 MG tablet; Take 0.5-1 tablets (2.5-5 mg) by mouth nightly as needed for sleep             BMI:   Estimated body mass index is 28.84 kg/m  as calculated from the following:    Height as of this encounter: 1.702 m (5' 7\").    Weight as of this encounter: 83.5 kg (184 lb 2 oz).           No follow-ups on file.    Areli Anderson MD  [facility]    Salazar Mendoza is a 22 year old, presenting for the following health issues:  Sleep Problem (Trouble falling, staying asleep, extreme fatigue)         View : No data to display.              History of Present Illness       Reason for visit:  Sleep issues    He eats 2-3 servings of fruits and vegetables daily.He consumes 0 sweetened beverage(s) daily.He exercises with enough effort to increase his heart rate 30 to 60 minutes per day.  He exercises with enough effort to increase his heart rate 4 days per week.   He is taking medications regularly.    Today's PHQ-9         PHQ-9 Total Score: 5    PHQ-9 Q9 Thoughts of better off dead/self-harm past 2 weeks :   Not at all    How difficult have these problems made it for you to do your work, take care of things at home, or get along with other people: Somewhat difficult  Today's THANH-7 Score: 5     Sleep issues  - trouble falling asleep, trouble staying asleep   - works nights as a PCA sometimes days.   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      Objective    /68 (BP Location: Right arm, Patient Position: Sitting, Cuff Size: Adult Regular)   Pulse 70   Temp 97.8  F (36.6  C) (Tympanic)   Resp 18   Ht 1.702 m (5' 7\")   Wt 83.5 kg (184 lb 2 oz)   SpO2 98%   BMI 28.84 kg/m    Body mass index is 28.84 kg/m .  Physical Exam   GENERAL: healthy, alert and no distress  PSYCH: mentation appears normal, affect normal/bright                    "

## 2023-11-06 DIAGNOSIS — G47.26 SHIFT WORK SLEEP DISORDER: ICD-10-CM

## 2023-11-07 NOTE — TELEPHONE ENCOUNTER
zolpidem (AMBIEN) 5 MG tablet         Sig: Take 0.5-1 tablets (2.5-5 mg) by mouth nightly as needed for sleep         Last Written Prescription Date:  4/21/23  Last Fill Quantity: 30   # refills: 5  Last Office Visit: 4/21/23  Future Office visit:       Routing refill request to provider for review/approval because:  Drug not on the [facility], [facility] or [facility] refill protocol or controlled substance aPu Hui RN on 11/7/2023 at 10:37 AM

## 2023-11-08 RX ORDER — ZOLPIDEM TARTRATE 5 MG/1
2.5-5 TABLET ORAL
Qty: 30 TABLET | Refills: 5 | Status: SHIPPED | OUTPATIENT
Start: 2023-11-08

## 2023-11-09 ENCOUNTER — TELEPHONE (OUTPATIENT)
Dept: FAMILY MEDICINE | Facility: OTHER | Age: 23
End: 2023-11-09
Payer: COMMERCIAL

## 2023-11-09 NOTE — TELEPHONE ENCOUNTER
Insurance denial received for Zolpidem Tartrate 5 mg tablet 1 tab daily. Call to patient, he will call his insurance company and see what out of pocket cost will be for his medication. Other medications in similar class were not an option and would have the same response. Reviewed with [facility] pharmacy and out of pocket cost was 13.99 for 30 tabs for 30 days. Patient will call back if he would like any farther assistance in this area. Yuridia Zuñiga RN on 11/9/2023 at 10:18 AM

## 2023-11-16 ENCOUNTER — TELEPHONE (OUTPATIENT)
Dept: FAMILY MEDICINE | Facility: OTHER | Age: 23
End: 2023-11-16

## 2023-11-16 NOTE — TELEPHONE ENCOUNTER
They are calling regarding a prior auth for Zolpidem, it was denied by insurance.  They are sending a letter as to why it denied.        Елена Carr on 11/16/2023 at 10:43 AM

## 2023-11-16 NOTE — TELEPHONE ENCOUNTER
Will watch for letter to start processing prior authorization.  Meron Saha LPN............11/16/2023 1:02 PM

## 2023-11-27 NOTE — TELEPHONE ENCOUNTER
Have not seen any paperwork regarding medication not being formulary or requesting a prior authorization.  Routing to [facility] department to inquire if they have received anything about Ambien 5mg coverage from [employer].    Meron Saha LPN............11/27/2023 2:22 PM

## (undated) RX ORDER — TETRACAINE HYDROCHLORIDE 5 MG/ML
SOLUTION OPHTHALMIC
Status: DISPENSED
Start: 2021-06-07